# Patient Record
Sex: MALE | Race: WHITE | Employment: FULL TIME | URBAN - METROPOLITAN AREA
[De-identification: names, ages, dates, MRNs, and addresses within clinical notes are randomized per-mention and may not be internally consistent; named-entity substitution may affect disease eponyms.]

---

## 2017-02-27 ENCOUNTER — TELEPHONE (OUTPATIENT)
Dept: CARDIOLOGY CLINIC | Age: 62
End: 2017-02-27

## 2017-02-28 ENCOUNTER — TELEPHONE (OUTPATIENT)
Dept: CARDIOLOGY CLINIC | Age: 62
End: 2017-02-28

## 2017-03-09 ENCOUNTER — HOSPITAL ENCOUNTER (OUTPATIENT)
Dept: CT IMAGING | Age: 62
Discharge: HOME | End: 2017-03-09
Attending: FAMILY MEDICINE | Admitting: FAMILY MEDICINE

## 2017-03-09 DIAGNOSIS — D41.02 HEMANGIOPERICYTOMA OF KIDNEY, LEFT: ICD-10-CM

## 2017-03-09 RX ORDER — SODIUM CHLORIDE 0.9 % (FLUSH) 0.9 %
10 SYRINGE (ML) INJECTION
Status: COMPLETED | OUTPATIENT
Start: 2017-03-09 | End: 2017-03-09

## 2017-03-09 RX ADMIN — Medication 10 ML: at 11:19

## 2017-03-17 ENCOUNTER — NURSE ONLY (OUTPATIENT)
Dept: CARDIOLOGY CLINIC | Age: 62
End: 2017-03-17

## 2017-03-17 ENCOUNTER — OFFICE VISIT (OUTPATIENT)
Dept: CARDIOLOGY CLINIC | Age: 62
End: 2017-03-17

## 2017-03-17 VITALS
DIASTOLIC BLOOD PRESSURE: 70 MMHG | BODY MASS INDEX: 45.1 KG/M2 | HEART RATE: 71 BPM | OXYGEN SATURATION: 95 % | HEIGHT: 70 IN | SYSTOLIC BLOOD PRESSURE: 100 MMHG | WEIGHT: 315 LBS

## 2017-03-17 DIAGNOSIS — I48.0 PAF (PAROXYSMAL ATRIAL FIBRILLATION) (HCC): Primary | ICD-10-CM

## 2017-03-17 DIAGNOSIS — I48.91 ATRIAL FIBRILLATION WITH RVR (HCC): ICD-10-CM

## 2017-03-17 PROCEDURE — 93000 ELECTROCARDIOGRAM COMPLETE: CPT | Performed by: INTERNAL MEDICINE

## 2017-03-17 PROCEDURE — 99999 PR OFFICE/OUTPT VISIT,PROCEDURE ONLY: CPT | Performed by: INTERNAL MEDICINE

## 2017-03-17 PROCEDURE — 99214 OFFICE O/P EST MOD 30 MIN: CPT | Performed by: INTERNAL MEDICINE

## 2017-03-17 RX ORDER — ATORVASTATIN CALCIUM 40 MG/1
20 TABLET, FILM COATED ORAL DAILY
COMMUNITY
Start: 2017-02-23

## 2017-03-17 RX ORDER — AMLODIPINE BESYLATE 5 MG/1
2.5 TABLET ORAL DAILY
Qty: 30 TABLET | Refills: 3
Start: 2017-03-17 | End: 2017-06-07 | Stop reason: ALTCHOICE

## 2017-03-20 ENCOUNTER — TELEPHONE (OUTPATIENT)
Dept: CARDIOLOGY CLINIC | Age: 62
End: 2017-03-20

## 2017-03-24 ENCOUNTER — TELEPHONE (OUTPATIENT)
Dept: CARDIOLOGY CLINIC | Age: 62
End: 2017-03-24

## 2017-03-27 PROBLEM — G47.33 OSA (OBSTRUCTIVE SLEEP APNEA): Status: ACTIVE | Noted: 2017-03-27

## 2017-03-27 PROBLEM — G47.33 OSA ON CPAP: Status: ACTIVE | Noted: 2017-03-27

## 2017-03-27 PROBLEM — Z99.89 OSA ON CPAP: Status: ACTIVE | Noted: 2017-03-27

## 2017-04-02 ASSESSMENT — ENCOUNTER SYMPTOMS
CHEST TIGHTNESS: 0
EYE PAIN: 0
ABDOMINAL PAIN: 0
DIARRHEA: 0
WHEEZING: 0
NAUSEA: 0
BACK PAIN: 1
COUGH: 0
BLOOD IN STOOL: 0
VOMITING: 0
SHORTNESS OF BREATH: 1
PHOTOPHOBIA: 0
CONSTIPATION: 0
COLOR CHANGE: 0

## 2017-04-07 PROCEDURE — 93228 REMOTE 30 DAY ECG REV/REPORT: CPT | Performed by: INTERNAL MEDICINE

## 2017-04-19 ENCOUNTER — NURSE ONLY (OUTPATIENT)
Dept: CARDIOLOGY CLINIC | Age: 62
End: 2017-04-19

## 2017-04-19 ENCOUNTER — OFFICE VISIT (OUTPATIENT)
Dept: CARDIOLOGY CLINIC | Age: 62
End: 2017-04-19

## 2017-04-19 VITALS
HEART RATE: 62 BPM | DIASTOLIC BLOOD PRESSURE: 60 MMHG | WEIGHT: 315 LBS | HEIGHT: 71 IN | BODY MASS INDEX: 44.1 KG/M2 | SYSTOLIC BLOOD PRESSURE: 100 MMHG

## 2017-04-19 DIAGNOSIS — I48.0 PAF (PAROXYSMAL ATRIAL FIBRILLATION) (HCC): Primary | ICD-10-CM

## 2017-04-19 DIAGNOSIS — I49.3 PVC (PREMATURE VENTRICULAR CONTRACTION): ICD-10-CM

## 2017-04-19 DIAGNOSIS — I49.3 PVC (PREMATURE VENTRICULAR CONTRACTION): Primary | ICD-10-CM

## 2017-04-19 PROCEDURE — 99214 OFFICE O/P EST MOD 30 MIN: CPT | Performed by: INTERNAL MEDICINE

## 2017-04-19 PROCEDURE — 93000 ELECTROCARDIOGRAM COMPLETE: CPT | Performed by: INTERNAL MEDICINE

## 2017-04-19 PROCEDURE — 93225 XTRNL ECG REC<48 HRS REC: CPT | Performed by: INTERNAL MEDICINE

## 2017-04-26 PROCEDURE — 93227 XTRNL ECG REC<48 HR R&I: CPT | Performed by: INTERNAL MEDICINE

## 2017-04-28 ENCOUNTER — OFFICE VISIT (OUTPATIENT)
Dept: CARDIOLOGY CLINIC | Age: 62
End: 2017-04-28

## 2017-04-28 DIAGNOSIS — I48.0 PAF (PAROXYSMAL ATRIAL FIBRILLATION) (HCC): Primary | ICD-10-CM

## 2017-04-28 PROCEDURE — 99999 PR OFFICE/OUTPT VISIT,PROCEDURE ONLY: CPT | Performed by: INTERNAL MEDICINE

## 2017-04-30 ASSESSMENT — ENCOUNTER SYMPTOMS
WHEEZING: 0
SHORTNESS OF BREATH: 1
CHEST TIGHTNESS: 0
BLOOD IN STOOL: 0
ABDOMINAL PAIN: 0
COLOR CHANGE: 0
NAUSEA: 0
PHOTOPHOBIA: 0
BACK PAIN: 1
CONSTIPATION: 0
EYE PAIN: 0
VOMITING: 0
DIARRHEA: 0
COUGH: 0

## 2017-06-07 ENCOUNTER — OFFICE VISIT (OUTPATIENT)
Dept: BARIATRICS/WEIGHT MGMT | Age: 62
End: 2017-06-07

## 2017-06-07 VITALS
WEIGHT: 315 LBS | DIASTOLIC BLOOD PRESSURE: 53 MMHG | BODY MASS INDEX: 44.1 KG/M2 | HEART RATE: 69 BPM | SYSTOLIC BLOOD PRESSURE: 96 MMHG | HEIGHT: 71 IN

## 2017-06-07 DIAGNOSIS — E66.01 MORBID OBESITY WITH BMI OF 50.0-59.9, ADULT (HCC): Primary | ICD-10-CM

## 2017-06-07 PROCEDURE — 99205 OFFICE O/P NEW HI 60 MIN: CPT | Performed by: SURGERY

## 2017-06-09 DIAGNOSIS — E66.01 MORBID OBESITY WITH BMI OF 50.0-59.9, ADULT (HCC): Primary | ICD-10-CM

## 2017-06-13 ENCOUNTER — OFFICE VISIT (OUTPATIENT)
Dept: BARIATRICS/WEIGHT MGMT | Age: 62
End: 2017-06-13

## 2017-06-13 VITALS
HEART RATE: 62 BPM | BODY MASS INDEX: 44.1 KG/M2 | DIASTOLIC BLOOD PRESSURE: 58 MMHG | WEIGHT: 315 LBS | SYSTOLIC BLOOD PRESSURE: 114 MMHG | HEIGHT: 71 IN

## 2017-06-13 DIAGNOSIS — E66.01 OBESITY, MORBID, BMI 50 OR HIGHER (HCC): Primary | ICD-10-CM

## 2017-06-13 PROCEDURE — 99999 PR OFFICE/OUTPT VISIT,PROCEDURE ONLY: CPT

## 2017-09-13 ENCOUNTER — OFFICE VISIT (OUTPATIENT)
Dept: BARIATRICS/WEIGHT MGMT | Age: 62
End: 2017-09-13

## 2017-09-13 VITALS
BODY MASS INDEX: 44.1 KG/M2 | WEIGHT: 315 LBS | SYSTOLIC BLOOD PRESSURE: 126 MMHG | HEART RATE: 65 BPM | HEIGHT: 71 IN | DIASTOLIC BLOOD PRESSURE: 73 MMHG

## 2017-09-13 DIAGNOSIS — E66.01 MORBID OBESITY WITH BMI OF 50.0-59.9, ADULT (HCC): Primary | ICD-10-CM

## 2017-09-13 PROCEDURE — 99214 OFFICE O/P EST MOD 30 MIN: CPT | Performed by: SURGERY

## 2017-09-13 RX ORDER — BUSPIRONE HYDROCHLORIDE 10 MG/1
15 TABLET ORAL
COMMUNITY

## 2017-09-25 ENCOUNTER — TELEPHONE (OUTPATIENT)
Dept: BARIATRICS/WEIGHT MGMT | Age: 62
End: 2017-09-25

## 2017-10-12 ENCOUNTER — OFFICE VISIT (OUTPATIENT)
Dept: BARIATRICS/WEIGHT MGMT | Age: 62
End: 2017-10-12

## 2017-10-12 VITALS
SYSTOLIC BLOOD PRESSURE: 120 MMHG | BODY MASS INDEX: 44.1 KG/M2 | HEART RATE: 62 BPM | OXYGEN SATURATION: 95 % | DIASTOLIC BLOOD PRESSURE: 76 MMHG | WEIGHT: 315 LBS | HEIGHT: 71 IN

## 2017-10-12 DIAGNOSIS — E66.01 MORBID OBESITY WITH BMI OF 50.0-59.9, ADULT (HCC): Primary | ICD-10-CM

## 2017-10-12 DIAGNOSIS — Z01.818 PREOPERATIVE EVALUATION TO RULE OUT SURGICAL CONTRAINDICATION: ICD-10-CM

## 2017-10-12 DIAGNOSIS — G47.33 OBSTRUCTIVE SLEEP APNEA: ICD-10-CM

## 2017-10-12 PROCEDURE — 99214 OFFICE O/P EST MOD 30 MIN: CPT | Performed by: NURSE PRACTITIONER

## 2017-10-12 ASSESSMENT — ENCOUNTER SYMPTOMS
TROUBLE SWALLOWING: 0
SORE THROAT: 0
NAUSEA: 0
EYE DISCHARGE: 0
ABDOMINAL DISTENTION: 0
ANAL BLEEDING: 0
BACK PAIN: 1
CONSTIPATION: 0
BLOOD IN STOOL: 0
CHEST TIGHTNESS: 0
ALLERGIC/IMMUNOLOGIC NEGATIVE: 1
COUGH: 0
EYES NEGATIVE: 1
DIARRHEA: 0
EYE PAIN: 0
COLOR CHANGE: 0
CHOKING: 0
VOMITING: 0
SHORTNESS OF BREATH: 1
WHEEZING: 0
EYE ITCHING: 0
GASTROINTESTINAL NEGATIVE: 1

## 2017-10-12 NOTE — PROGRESS NOTES
1 tablet by mouth daily 60 tablet 3    metFORMIN (GLUCOPHAGE) 1000 MG tablet Take 1,000 mg by mouth 2 times daily (with meals)      hydrOXYzine (ATARAX) 50 MG tablet Take 100 mg by mouth nightly       aspirin EC 81 MG EC tablet Take 1 tablet by mouth daily. 30 tablet 3    gabapentin (NEURONTIN) 600 MG tablet Take 600 mg by mouth 2 times daily       FLUoxetine (PROZAC) 10 MG capsule Take 20 mg by mouth daily       lisinopril (PRINIVIL;ZESTRIL) 40 MG tablet Take 40 mg by mouth 2 times daily       buPROPion (WELLBUTRIN SR) 150 MG SR tablet Take 150 mg by mouth daily       doxazosin (CARDURA) 4 MG tablet Take 4 mg by mouth nightly. No current facility-administered medications for this visit. No Known Allergies        Review of Systems   Constitutional: Negative. Negative for appetite change, chills, fatigue and fever. HENT: Negative. Negative for congestion, hearing loss, sore throat, tinnitus and trouble swallowing. Eyes: Negative. Negative for pain, discharge and itching. Respiratory: Positive for shortness of breath (with exertion). Negative for cough, choking, chest tightness and wheezing. Sleep apnea   Cardiovascular: Positive for leg swelling (mild). Negative for chest pain and palpitations. Gastrointestinal: Negative. Negative for abdominal distention, anal bleeding, blood in stool, constipation, diarrhea, nausea and vomiting. Endocrine: Negative. Negative for cold intolerance and heat intolerance. Diabetes Type II   Genitourinary: Negative. Negative for dysuria, frequency and urgency. Musculoskeletal: Positive for back pain. Negative for arthralgias, gait problem and myalgias. Skin: Negative. Negative for color change, pallor and rash. Allergic/Immunologic: Negative. Negative for environmental allergies and food allergies. Neurological: Negative. Negative for dizziness, seizures, weakness and numbness. Hematological: Bruises/bleeds easily. Eliquis   Psychiatric/Behavioral: Positive for dysphoric mood and sleep disturbance. Negative for agitation, behavioral problems and suicidal ideas. The patient is nervous/anxious. Depression/anxiety controlled with medication       OBJECTIVE:    /76 (Site: Left Arm, Position: Sitting, Cuff Size: Large Adult)   Pulse 62   Ht 5' 11\" (1.803 m)   Wt (!) 368 lb 11.2 oz (167.2 kg)   SpO2 95%   BMI 51.42 kg/m²      Physical Exam   Constitutional: He is oriented to person, place, and time. He appears well-developed and well-nourished. HENT:   Head: Normocephalic and atraumatic. Eyes: Conjunctivae and EOM are normal. Pupils are equal, round, and reactive to light. Neck: Normal range of motion. Neck supple. No JVD present. No tracheal deviation present. No thyromegaly present. Cardiovascular: Normal rate, regular rhythm, normal heart sounds and intact distal pulses. Exam reveals no gallop and no friction rub. No murmur heard. Pulmonary/Chest: Breath sounds normal. No respiratory distress. He has no wheezes. He has no rales. He exhibits no tenderness. Abdominal: Soft. Bowel sounds are normal. He exhibits distension. He exhibits no mass. There is no tenderness. There is no rebound and no guarding. Morbid obese; softly distended   Musculoskeletal: Normal range of motion. Lymphadenopathy:     He has no cervical adenopathy. Neurological: He is alert and oriented to person, place, and time. Skin: Skin is warm and dry. Psychiatric: He has a normal mood and affect. Vitals reviewed. ASSESSMENT & PLAN:    1. Morbid obesity with BMI of 50.0-59.9, adult ([address]  -Encouraged consistency with weight loss; avoid over eating and high calorie snacks; avoid skipping meals  -Discussed below recommendations  -Needs physical therapy and Cardiology and Pulmonology clearance  -EGD tomorrow    2.  Preoperative evaluation to rule out surgical contraindication  -Needs to be consistent with weight loss and appts. Discussed importance with patient  -Complete PT and Pulmonology clearance  -Placed Cardiology referral for clearance    3. Obstructive sleep apnea  -Continue using Albuterol as directed  -Continue to use CPAP and follow-up with Dr. Fran Gregorio as scheduled       Patient was encouraged to journal all food intake. Keep calorie level at approximately 2145-4126. Protein intake is to be a minimum of 40-50 grams per day. Water drinking was encouraged with a goal of 64oz-128oz daily. Beverages to be calorie free except for milk and avoid soda. Continue to increase level of physical activity. I spent 25 minutes with the patient face to face today and over 50% of the office visit today was spent in face to face counseling regarding diet and exercise, in preparation for his planned Robotic Sleeve Gastrectomy. Discussed in length complying with the dietary recommendations, complying with the preoperative workup including dietary counseling, exercise physiologist counseling, and pre-operative optimization of pulmonologist and cardiologist.  The patient expressed understanding and willingness to comply nicely; all questions and concerns addressed. No orders of the defined types were placed in this encounter. No orders of the defined types were placed in this encounter. Follow Up:  No Follow-up on file.     Electronically signed by Camilo Field CNP on 10/12/2017 at 12:25 PM

## 2017-10-13 DIAGNOSIS — E66.01 MORBID OBESITY WITH BMI OF 50.0-59.9, ADULT (HCC): Primary | ICD-10-CM

## 2017-10-20 ENCOUNTER — HOSPITAL ENCOUNTER (OUTPATIENT)
Dept: OTHER | Age: 62
Discharge: HOME | End: 2017-10-31
Attending: SURGERY | Admitting: SURGERY

## 2017-10-25 ENCOUNTER — OFFICE VISIT (OUTPATIENT)
Dept: BARIATRICS/WEIGHT MGMT | Age: 62
End: 2017-10-25

## 2017-10-25 VITALS
WEIGHT: 315 LBS | BODY MASS INDEX: 45.1 KG/M2 | DIASTOLIC BLOOD PRESSURE: 80 MMHG | HEART RATE: 78 BPM | SYSTOLIC BLOOD PRESSURE: 138 MMHG | HEIGHT: 70 IN

## 2017-10-25 DIAGNOSIS — E66.01 MORBID OBESITY WITH BMI OF 50.0-59.9, ADULT (HCC): Primary | ICD-10-CM

## 2017-10-25 PROCEDURE — 99214 OFFICE O/P EST MOD 30 MIN: CPT | Performed by: SURGERY

## 2017-10-25 PROCEDURE — 1036F TOBACCO NON-USER: CPT | Performed by: SURGERY

## 2017-10-25 PROCEDURE — 3017F COLORECTAL CA SCREEN DOC REV: CPT | Performed by: SURGERY

## 2017-10-25 PROCEDURE — G8484 FLU IMMUNIZE NO ADMIN: HCPCS | Performed by: SURGERY

## 2017-10-25 PROCEDURE — G8427 DOCREV CUR MEDS BY ELIG CLIN: HCPCS | Performed by: SURGERY

## 2017-10-25 PROCEDURE — G8417 CALC BMI ABV UP PARAM F/U: HCPCS | Performed by: SURGERY

## 2017-10-25 ASSESSMENT — ENCOUNTER SYMPTOMS
VOICE CHANGE: 0
BLOOD IN STOOL: 0
NAUSEA: 0
VOMITING: 0
ABDOMINAL PAIN: 0
COUGH: 0
PHOTOPHOBIA: 0
TROUBLE SWALLOWING: 0
WHEEZING: 0
SORE THROAT: 0
ANAL BLEEDING: 0
CONSTIPATION: 0
SHORTNESS OF BREATH: 0
DIARRHEA: 0
COLOR CHANGE: 0

## 2017-10-25 NOTE — PROGRESS NOTES
BARIATRIC SURGERY OFFICE NOTE    SUBJECTIVE:    Patient presenting today referred from Eva Brennan MD, for   Chief Complaint   Patient presents with    Weight Loss     visit #4    . Vitals:    10/25/17 1108   BP: 138/80   Pulse: 78        HPI: Willi Dutta is a 58 y.o. male presenting in fourth bariatric visit, follow up diet and exercise - pre-operative weight loss, in consideration for bariatric surgery. BMI: Body mass index is 52.62 kg/m². Obesity Classification: Super Morbid Obesity. Weight History: Wt Readings from Last 3 Encounters:   10/25/17 (!) 366 lb 11.2 oz (166.3 kg)   10/11/17 (!) 368 lb (166.9 kg)   10/12/17 (!) 368 lb 11.2 oz (167.2 kg)     Total weight loss 2 Lbs  Since last visit, total 1.7 lbs since 1st visit    Patient dines out to a sit down restaurant  times per 0. Patient eats fast food meals 2 times per mo    Drinks mostly water and some crystal light, a few pepsi, sugar free    24 hour recall/food frequency chart:  Breakfast: banana   Snack:   Lunch:  cottage cheese apples,   Snack:  Dinner: meatloaf mashed potatoes  Snack: banana    Total daily calories: no      Exercises 1  Times per day, resistance band, hand bike, walking         Thoroughly reviewed the patient's medical history, family history, social history and review of systems with the patient today in the office. Please see medical record for pertinent positives.       Past Medical History:   Diagnosis Date    Anxiety     Arthritis     Bipolar disorder ([facility] [address]     BPH (benign prostatic hyperplasia)     Chronic back pain     Chronic low back pain     \"had fractured back - 2004\"    CKD (chronic kidney disease)     \"took him off Metformin and decrease his Lasix to try and protect his kidneys\" \"they just said the kidneys are lower functioning-has not seen kidney doctor\" per wife    COPD (chronic obstructive pulmonary disease) ([facility] [address]     follows with Dr Bunn Sport Depression     Diabetic neuropathy ([facility] [address]  Gastric reflux     Hepatitis     :had this 40 yrs ago not sure what type\"    History of cardiac monitoring 03/17/2017    21 day event: Sinus rhythm No atrial fibrillation Patient has PVCs and this correlates with symptoms of flutter and palpitations.  Plan - Holter for 24 hrs to assess PVC burden (holter applied 4/19)    History of Holter monitoring 04/21/2017    48 hour monitoring: No significant arrhythmias    History of kidney stones     Hyperlipidemia     Hypertension     Irregular heart beat     follows with Dr Diamond Morris Obesity     Sleep apnea     had sleep study several yrs ago- does use cpap    Type 2 diabetes mellitus without complication ([facility] [address]     dx 2015      Past Surgical History:   Procedure Laterality Date    CARDIAC CATHETERIZATION      per old chart had cardiac cath done 9/2012    COLONOSCOPY      per old chart hx colonoscopy with Dr Jordin Pop 9/2010    JOINT REPLACEMENT      per wife total right knee done 2014  and left knee 2015    KIDNEY STONE SURGERY  10+ yrs ago    KNEE SURGERY      right, left/ ( had scope both knee prior to total)- while in service had surgery on right knee also    SHOULDER SURGERY  1982    right    VASECTOMY       Current Outpatient Prescriptions   Medication Sig Dispense Refill    glimepiride (AMARYL) 1 MG tablet Take 1 mg by mouth nightly      amLODIPine (NORVASC) 5 MG tablet Take 5 mg by mouth daily      busPIRone (BUSPAR) 10 MG tablet Take 15 mg by mouth Takes one tab in am and two tabs at hs      fluticasone (FLONASE) 50 MCG/ACT nasal spray 1 spray by Nasal route daily 3 Bottle 3    azelastine (ASTELIN) 0.1 % nasal spray 2 sprays by Nasal route 2 times daily Use in each nostril as directed 3 Bottle 3    Spacer/Aero-Holding Chambers (AEROCHAMBER) MISC Inhale 1 Device into the lungs every 6 hours 1 each 0    atorvastatin (LIPITOR) 40 MG tablet Take 20 mg by mouth daily       amiodarone (CORDARONE) 200 MG tablet Take 1 tablet by mouth daily 30 tablet Judgment and thought content normal.   Vitals reviewed. POST-OP DIAGNOSIS: Same +   Per the EGD performed all the way to the 3rd portion of the duodenum:  - Z-line was @ 42 cm from the superior incisors' level  - Some GERD stigmata noted, Small size sliding Hiatal Hernia noted, with mild GERD but no changes suspicious of Solares's  - Mild antral gastritis  - No peptic ulcer disease  - No biliary reflux    ASSESSMENT & PLAN:    1. Morbid obesity with BMI of 50.0-59.9, adult ([address]         Nicely complicant - 4/6 months - workup complete - doing very well - continue D/E. Patient was encouraged to journal all food intake. Keep calorie level at approximately 3982-7216. Protein intake is to be a minimum of 60-80 grams per day. Water drinking was encouraged with a goal of 64oz-128oz daily. Beverages to be calorie free except for milk. Every other beverage should be water. They are to avoid soda. Continue to increase level of physical activity. More than 50% of the office visit today was spent in face to face counseling regarding diet and exercise, in preparation for his planned Robotic Sleeve Gastrectomy. Counting calories, complying with the dietitian's recommendations, and complying with the preoperative workup including the dietitian counseling, exercise physiologist counseling, cardiologist evaluation and pre-operative optimization, pulmonologist evaluation and pre operative optimization, pre operative EGD evaluation. The patient expressed understanding and willingness to comply nicely; all questions and concerns addressed in details. Patient counseled on the risks, benefits, and alternatives of treatment plan at length while in the office today. Patient states an understanding and willingness to proceed with the plan. Follow Up:  Return in about 4 weeks (around 11/22/2017) for For imaging and tests results review, Bariatric follow up: diet, exercise & weight loss.       Pal Ramey MD,

## 2017-10-31 ENCOUNTER — TELEPHONE (OUTPATIENT)
Dept: BARIATRICS/WEIGHT MGMT | Age: 62
End: 2017-10-31

## 2017-10-31 DIAGNOSIS — Z01.818 PREOPERATIVE EVALUATION TO RULE OUT SURGICAL CONTRAINDICATION: Primary | ICD-10-CM

## 2017-10-31 NOTE — TELEPHONE ENCOUNTER
Please notify patient that referral was placed for Cardiologist for clearance for bariatric surgery. Thank you.

## 2017-11-01 ENCOUNTER — HOSPITAL ENCOUNTER (OUTPATIENT)
Dept: OTHER | Age: 62
Discharge: HOME | End: 2017-11-08
Attending: SURGERY | Admitting: SURGERY

## 2017-12-06 ENCOUNTER — OFFICE VISIT (OUTPATIENT)
Dept: BARIATRICS/WEIGHT MGMT | Age: 62
End: 2017-12-06

## 2017-12-06 VITALS
SYSTOLIC BLOOD PRESSURE: 115 MMHG | BODY MASS INDEX: 44.1 KG/M2 | RESPIRATION RATE: 16 BRPM | WEIGHT: 315 LBS | DIASTOLIC BLOOD PRESSURE: 58 MMHG | HEIGHT: 71 IN | HEART RATE: 67 BPM

## 2017-12-06 DIAGNOSIS — G47.33 OSA (OBSTRUCTIVE SLEEP APNEA): ICD-10-CM

## 2017-12-06 DIAGNOSIS — K44.9 HIATAL HERNIA: ICD-10-CM

## 2017-12-06 DIAGNOSIS — E66.01 MORBID OBESITY WITH BMI OF 50.0-59.9, ADULT (HCC): ICD-10-CM

## 2017-12-06 DIAGNOSIS — G47.33 OSA ON CPAP: ICD-10-CM

## 2017-12-06 DIAGNOSIS — I48.91 ATRIAL FIBRILLATION WITH RVR (HCC): Primary | ICD-10-CM

## 2017-12-06 DIAGNOSIS — Z99.89 OSA ON CPAP: ICD-10-CM

## 2017-12-06 PROCEDURE — 3017F COLORECTAL CA SCREEN DOC REV: CPT | Performed by: SURGERY

## 2017-12-06 PROCEDURE — G8417 CALC BMI ABV UP PARAM F/U: HCPCS | Performed by: SURGERY

## 2017-12-06 PROCEDURE — G8484 FLU IMMUNIZE NO ADMIN: HCPCS | Performed by: SURGERY

## 2017-12-06 PROCEDURE — 1036F TOBACCO NON-USER: CPT | Performed by: SURGERY

## 2017-12-06 PROCEDURE — G8427 DOCREV CUR MEDS BY ELIG CLIN: HCPCS | Performed by: SURGERY

## 2017-12-06 PROCEDURE — 99214 OFFICE O/P EST MOD 30 MIN: CPT | Performed by: SURGERY

## 2017-12-06 ASSESSMENT — ENCOUNTER SYMPTOMS
COUGH: 0
PHOTOPHOBIA: 0
ANAL BLEEDING: 0
COLOR CHANGE: 0
BACK PAIN: 1
NAUSEA: 0
ABDOMINAL PAIN: 0
VOMITING: 0
SHORTNESS OF BREATH: 0
DIARRHEA: 0
SORE THROAT: 0
WHEEZING: 0
BLOOD IN STOOL: 0
CONSTIPATION: 0
TROUBLE SWALLOWING: 0
VOICE CHANGE: 0

## 2017-12-06 NOTE — PROGRESS NOTES
BARIATRIC SURGERY OFFICE NOTE    SUBJECTIVE:    Patient presenting today referred from Catie Barajas MD, for   Chief Complaint   Patient presents with   [employer]     5th WM visit, diet, exercise, and pre-surgical weight loss. .    Vitals:    12/06/17 0928   BP: (!) 115/58   Pulse: 67   Resp: 16        HPI: Vish Sparrow is a 58 y.o. male  presenting in fifth bariatric visit, follow up diet and exercise - pre-operative weight loss, in consideration for bariatric surgery. BMI: Body mass index is 51.55 kg/m². Obesity Classification: III Morbid Obesity. Weight History: Wt Readings from Last 3 Encounters:   12/06/17 (!) 369 lb 9.6 oz (167.6 kg)   10/25/17 (!) 366 lb 11.2 oz (166.3 kg)   10/11/17 (!) 368 lb (166.9 kg)     Total weight loss/gain +2.9 Lbs over 6 week. Patient dines out to a sit down restaurant 2 times per month. Patient eats fast food meals 2 times per month. Drinks mostly water and diet pepsi    24 hour recall/food frequency chart:  Breakfast: banana  Snack: slimfast shake  Lunch: sandwich  Snack: none  Dinner: 2 slices of pizza  Snack: none    Total daily calories: not calculating      Exercises: weights and walking 3 times per week          Thoroughly reviewed the patient's medical history, family history, social history and review of systems with the patient today in the office. Please see medical record for pertinent positives.       Past Medical History:   Diagnosis Date    Anxiety     Arthritis     B[facility] disorder ([facility] [address]     BPH (benign prostatic hyperplasia)     Chronic back pain     Chronic low back pain     \"had fractured back - 2004\"    CKD (chronic kidney disease)     \"took him off Metformin and decrease his Lasix to try and protect his kidneys\" \"they just said the kidneys are lower functioning-has not seen kidney doctor\" per wife    COPD (chronic obstructive pulmonary disease) ([facility] [address]     follows with Dr Omi Mejia Depression     Diabetic neuropathy ([facility] [address]     Gastric reflux     Hepatitis     :had this 40 yrs ago not sure what type\"    History of cardiac monitoring 03/17/2017    21 day event: Sinus rhythm No atrial fibrillation Patient has PVCs and this correlates with symptoms of flutter and palpitations.  Plan - Holter for 24 hrs to assess PVC burden (holter applied 4/19)    History of Holter monitoring 04/21/2017    48 hour monitoring: No significant arrhythmias    History of kidney stones     Hyperlipidemia     Hypertension     Irregular heart beat     follows with Dr Bandar Portillo Obesity     Sleep apnea     had sleep study several yrs ago- does use cpap    Type 2 diabetes mellitus without complication ([facility] [address]     dx 2015      Past Surgical History:   Procedure Laterality Date    CARDIAC CATHETERIZATION      per old chart had cardiac cath done 9/2012    COLONOSCOPY      per old chart hx colonoscopy with Dr Bernie Fernandez 9/2010    JOINT REPLACEMENT      per wife total right knee done 2014  and left knee 2015    KIDNEY STONE SURGERY  10+ yrs ago    KNEE SURGERY      right, left/ ( had scope both knee prior to total)- while in service had surgery on right knee also    SHOULDER SURGERY  1982    right    VASECTOMY       Current Outpatient Prescriptions   Medication Sig Dispense Refill    glimepiride (AMARYL) 1 MG tablet Take 1 mg by mouth nightly      amLODIPine (NORVASC) 5 MG tablet Take 5 mg by mouth daily      busPIRone (BUSPAR) 10 MG tablet Take 15 mg by mouth Takes one tab in am and two tabs at hs      fluticasone (FLONASE) 50 MCG/ACT nasal spray 1 spray by Nasal route daily 3 Bottle 3    azelastine (ASTELIN) 0.1 % nasal spray 2 sprays by Nasal route 2 times daily Use in each nostril as directed 3 Bottle 3    Spacer/Aero-Holding Chambers (AEROCHAMBER) MISC Inhale 1 Device into the lungs every 6 hours 1 each 0    atorvastatin (LIPITOR) 40 MG tablet Take 20 mg by mouth daily       amiodarone (CORDARONE) 200 MG tablet Take 1 tablet by mouth daily 30 tablet 0    apixaban (ELIQUIS) 5 MG TABS tablet Take 1 tablet by mouth 2 times daily 60 tablet 0    lamoTRIgine (LAMICTAL) 100 MG tablet Take 1 tablet by mouth daily (Patient taking differently: Take 150 mg by mouth 2 times daily ) 30 tablet 0    risperiDONE (RISPERDAL) 1 MG tablet Take 1 tablet by mouth daily (Patient taking differently: Take 1 mg by mouth 2 times daily Takes one tab in am and two tabs at hs) 60 tablet 0    metoprolol succinate (TOPROL XL) 50 MG extended release tablet Take 1 tablet by mouth daily 30 tablet 0    metolazone (ZAROXOLYN) 2.5 MG tablet Take 1 tablet by mouth daily 30 tablet 3    furosemide (LASIX) 20 MG tablet Take 2 tablets by mouth 2 times daily (Patient taking differently: Take 20 mg by mouth 2 times daily Takes two tabs in am( 40 mg and one tab at hs ( 20 mg)) 60 tablet 3    potassium chloride (KLOR-CON M) 20 MEQ extended release tablet Take 1 tablet by mouth daily 60 tablet 3    metFORMIN (GLUCOPHAGE) 1000 MG tablet Take 1,000 mg by mouth 2 times daily (with meals)      hydrOXYzine (ATARAX) 50 MG tablet Take 100 mg by mouth nightly       aspirin EC 81 MG EC tablet Take 1 tablet by mouth daily. 30 tablet 3    gabapentin (NEURONTIN) 600 MG tablet Take 600 mg by mouth 2 times daily       FLUoxetine (PROZAC) 10 MG capsule Take 20 mg by mouth daily       lisinopril (PRINIVIL;ZESTRIL) 40 MG tablet Take 40 mg by mouth 2 times daily       buPROPion (WELLBUTRIN SR) 150 MG SR tablet Take 150 mg by mouth daily       doxazosin (CARDURA) 4 MG tablet Take 4 mg by mouth nightly. No current facility-administered medications for this visit. No Known Allergies        Review of Systems   Constitutional: Positive for appetite change, fatigue and unexpected weight change. Negative for activity change, chills, diaphoresis and fever. HENT: Negative for sore throat, trouble swallowing and voice change. Eyes: Negative for photophobia and visual disturbance. He has no cervical adenopathy. Neurological: He is alert and oriented to person, place, and time. No cranial nerve deficit. Coordination normal.   Skin: Skin is warm and dry. No rash noted. He is not diaphoretic. No erythema. No pallor. Psychiatric: His behavior is normal. Judgment and thought content normal.   Vitals reviewed. ASSESSMENT & PLAN:    1. Atrial fibrillation with RVR ([facility] [address]    2. Hiatal hernia    3. Morbid obesity with BMI of 50.0-59.9, adult ([facility] [address]    4. KALIA (obstructive sleep apnea)    5. KALIA on CPAP         October 13, 2017     PRE-OP DIAGNOSIS: GERD / Morbid obesity - Body mass index is 52.8 kg/m².     POST-OP DIAGNOSIS: Same +   Per the EGD performed all the way to the 3rd portion of the duodenum:  - Z-line was @ 42 cm from the superior incisors' level  - Some GERD stigmata noted, Small size sliding Hiatal Hernia noted, with mild GERD but no changes suspicious of Solares's  - Mild antral gastritis  - No peptic ulcer disease  - No biliary reflux       Patient was encouraged to journal all food intake. Keep calorie level at approximately 6847-7910. Protein intake is to be a minimum of 60-80 grams per day. Water drinking was encouraged with a goal of 64oz-128oz daily. Beverages to be calorie free except for milk. Every other beverage should be water. They are to avoid soda. Continue to increase level of physical activity. More than 50% of the office visit today (25 min) was spent in face to face counseling regarding diet and exercise, in preparation for his planned Robotic Sleeve Gastrectomy. Counting calories, complying with the dietitian's recommendations, and complying with the preoperative workup including the dietitian counseling, exercise physiologist counseling, cardiologist evaluation and pre-operative optimization, pulmonologist evaluation and pre operative optimization, pre operative EGD evaluation.   The patient expressed understanding and willingness to comply nicely; all questions and concerns addressed in details. Patient counseled on the risks, benefits, and alternatives of treatment plan at length while in the office today. Patient states an understanding and willingness to proceed with the plan. Follow Up:  Return in about 4 weeks (around 1/3/2018) for For imaging and tests results review, Bariatric follow up: diet, exercise & weight loss.       Snehal Moss MD, FACS, FICS  Member of the [employer]    [phone]    12/6/17

## 2017-12-19 ENCOUNTER — HOSPITAL ENCOUNTER (OUTPATIENT)
Dept: CT IMAGING | Age: 62
Discharge: HOME | End: 2017-12-19
Attending: FAMILY MEDICINE | Admitting: FAMILY MEDICINE

## 2017-12-19 DIAGNOSIS — R59.9 SWELLING OF LYMPH NODES: ICD-10-CM

## 2018-01-04 ENCOUNTER — HOSPITAL ENCOUNTER (OUTPATIENT)
Dept: ULTRASOUND IMAGING | Age: 63
Discharge: HOME | End: 2018-01-04
Attending: FAMILY MEDICINE | Admitting: FAMILY MEDICINE

## 2018-01-04 DIAGNOSIS — N28.1 ACQUIRED CYST OF KIDNEY: ICD-10-CM

## 2018-01-29 ENCOUNTER — OFFICE VISIT (OUTPATIENT)
Dept: BARIATRICS/WEIGHT MGMT | Age: 63
End: 2018-01-29

## 2018-01-29 VITALS
HEART RATE: 65 BPM | DIASTOLIC BLOOD PRESSURE: 59 MMHG | SYSTOLIC BLOOD PRESSURE: 108 MMHG | RESPIRATION RATE: 20 BRPM | WEIGHT: 315 LBS | HEIGHT: 71 IN | BODY MASS INDEX: 44.1 KG/M2

## 2018-01-29 DIAGNOSIS — G47.33 OSA ON CPAP: ICD-10-CM

## 2018-01-29 DIAGNOSIS — E11.69 DIABETES MELLITUS TYPE 2 IN OBESE (HCC): ICD-10-CM

## 2018-01-29 DIAGNOSIS — Z99.89 OSA ON CPAP: ICD-10-CM

## 2018-01-29 DIAGNOSIS — E66.9 DIABETES MELLITUS TYPE 2 IN OBESE (HCC): ICD-10-CM

## 2018-01-29 DIAGNOSIS — E66.01 MORBID OBESITY WITH BMI OF 50.0-59.9, ADULT (HCC): Primary | ICD-10-CM

## 2018-01-29 PROCEDURE — 1036F TOBACCO NON-USER: CPT | Performed by: NURSE PRACTITIONER

## 2018-01-29 PROCEDURE — G8484 FLU IMMUNIZE NO ADMIN: HCPCS | Performed by: NURSE PRACTITIONER

## 2018-01-29 PROCEDURE — 99214 OFFICE O/P EST MOD 30 MIN: CPT | Performed by: NURSE PRACTITIONER

## 2018-01-29 PROCEDURE — G8417 CALC BMI ABV UP PARAM F/U: HCPCS | Performed by: NURSE PRACTITIONER

## 2018-01-29 PROCEDURE — G8427 DOCREV CUR MEDS BY ELIG CLIN: HCPCS | Performed by: NURSE PRACTITIONER

## 2018-01-29 PROCEDURE — 3017F COLORECTAL CA SCREEN DOC REV: CPT | Performed by: NURSE PRACTITIONER

## 2018-01-29 PROCEDURE — 3046F HEMOGLOBIN A1C LEVEL >9.0%: CPT | Performed by: NURSE PRACTITIONER

## 2018-01-29 ASSESSMENT — ENCOUNTER SYMPTOMS
EYE PAIN: 0
NAUSEA: 0
ABDOMINAL PAIN: 0
ABDOMINAL DISTENTION: 0
DIARRHEA: 0
BACK PAIN: 1
CHEST TIGHTNESS: 0
SHORTNESS OF BREATH: 0
WHEEZING: 0
RHINORRHEA: 0
TROUBLE SWALLOWING: 0

## 2018-03-01 ENCOUNTER — OFFICE VISIT (OUTPATIENT)
Dept: BARIATRICS/WEIGHT MGMT | Age: 63
End: 2018-03-01

## 2018-03-01 VITALS
DIASTOLIC BLOOD PRESSURE: 62 MMHG | WEIGHT: 315 LBS | SYSTOLIC BLOOD PRESSURE: 104 MMHG | HEIGHT: 71 IN | HEART RATE: 68 BPM | RESPIRATION RATE: 20 BRPM | BODY MASS INDEX: 44.1 KG/M2

## 2018-03-01 DIAGNOSIS — G47.33 OSA (OBSTRUCTIVE SLEEP APNEA): ICD-10-CM

## 2018-03-01 DIAGNOSIS — E66.01 MORBID OBESITY WITH BMI OF 50.0-59.9, ADULT (HCC): Primary | ICD-10-CM

## 2018-03-01 PROCEDURE — 1036F TOBACCO NON-USER: CPT | Performed by: NURSE PRACTITIONER

## 2018-03-01 PROCEDURE — G8417 CALC BMI ABV UP PARAM F/U: HCPCS | Performed by: NURSE PRACTITIONER

## 2018-03-01 PROCEDURE — G8427 DOCREV CUR MEDS BY ELIG CLIN: HCPCS | Performed by: NURSE PRACTITIONER

## 2018-03-01 PROCEDURE — G8484 FLU IMMUNIZE NO ADMIN: HCPCS | Performed by: NURSE PRACTITIONER

## 2018-03-01 PROCEDURE — 3017F COLORECTAL CA SCREEN DOC REV: CPT | Performed by: NURSE PRACTITIONER

## 2018-03-01 PROCEDURE — 99213 OFFICE O/P EST LOW 20 MIN: CPT | Performed by: NURSE PRACTITIONER

## 2018-03-01 ASSESSMENT — ENCOUNTER SYMPTOMS
CHEST TIGHTNESS: 0
EYE PAIN: 0
RHINORRHEA: 0
WHEEZING: 0
ABDOMINAL DISTENTION: 0
DIARRHEA: 0
SHORTNESS OF BREATH: 0
BACK PAIN: 1
NAUSEA: 0
TROUBLE SWALLOWING: 0
ABDOMINAL PAIN: 0

## 2018-03-01 NOTE — PROGRESS NOTES
obstructive pulmonary disease) ([facility]     follows with Dr Charanjit Klein Depression     Diabetic neuropathy ([facility]     Gastric reflux     Hepatitis     :had this 40 yrs ago not sure what type\"    History of cardiac monitoring 03/17/2017    21 day event: Sinus rhythm No atrial fibrillation Patient has PVCs and this correlates with symptoms of flutter and palpitations.  Plan - Holter for 24 hrs to assess PVC burden (holter applied 4/19)    History of Holter monitoring 04/21/2017    48 hour monitoring: No significant arrhythmias    History of kidney stones     Hyperlipidemia     Hypertension     Irregular heart beat     follows with Dr Sunita Kim Obesity     Sleep apnea     had sleep study several yrs ago- does use cpap    Type 2 diabetes mellitus without complication ([facility]     dx 2015      Patient Active Problem List   Diagnosis    Atrial fibrillation with RVR ([facility]    KALIA (obstructive sleep apnea)    KALIA on CPAP    Morbid obesity with BMI of 50.0-59.9, adult ([facility]    Hiatal hernia     Past Surgical History:   Procedure Laterality Date    CARDIAC CATHETERIZATION      per old chart had cardiac cath done 9/2012    COLONOSCOPY      per old chart hx colonoscopy with Dr Yolie Bishop 9/2010    JOINT REPLACEMENT      per wife total right knee done 2014  and left knee 2015    KIDNEY STONE SURGERY  10+ yrs ago    KNEE SURGERY      right, left/ ( had scope both knee prior to total)- while in service had surgery on right knee also    SHOULDER SURGERY  1982    right    VASECTOMY       Current Outpatient Prescriptions   Medication Sig Dispense Refill    glimepiride (AMARYL) 1 MG tablet Take 1 mg by mouth nightly      amLODIPine (NORVASC) 5 MG tablet Take 5 mg by mouth daily      busPIRone (BUSPAR) 10 MG tablet Take 15 mg by mouth Takes one tab in am and two tabs at hs      fluticasone (FLONASE) 50 MCG/ACT nasal spray 1 spray by Nasal route daily 3 Bottle 3    azelastine (ASTELIN) 0.1 % nasal spray 2 sprays by

## 2018-07-24 ENCOUNTER — HOSPITAL ENCOUNTER (OUTPATIENT)
Dept: MRI IMAGING | Age: 63
Discharge: HOME | End: 2018-07-24
Attending: SURGERY | Admitting: SURGERY

## 2018-07-24 DIAGNOSIS — M25.511 PAIN IN RIGHT SHOULDER: ICD-10-CM

## 2018-07-24 DIAGNOSIS — M25.511 ACUTE PAIN OF RIGHT SHOULDER: ICD-10-CM

## 2023-10-19 NOTE — PROGRESS NOTES
Left message for patient to call back for PAT. Graft Donor Site Bandage (Optional-Leave Blank If You Don't Want In Note): Steri-strips and a pressure bandage were applied to the donor site.

## 2023-10-20 RX ORDER — DICLOFENAC SODIUM 1 MG/ML
1 SOLUTION/ DROPS OPHTHALMIC 4 TIMES DAILY
COMMUNITY

## 2023-10-20 RX ORDER — LORAZEPAM 0.5 MG/1
0.5 TABLET ORAL 2 TIMES DAILY PRN
COMMUNITY

## 2023-10-20 RX ORDER — AMOXICILLIN 500 MG
CAPSULE ORAL DAILY
COMMUNITY

## 2023-10-20 RX ORDER — GLIPIZIDE 10 MG/1
10 TABLET ORAL
COMMUNITY

## 2023-10-20 RX ORDER — PHENOL 1.4 %
AEROSOL, SPRAY (ML) MUCOUS MEMBRANE
COMMUNITY

## 2023-10-20 RX ORDER — MELATONIN
1000 DAILY
COMMUNITY

## 2023-10-20 RX ORDER — LIDOCAINE 40 MG/G
CREAM TOPICAL PRN
COMMUNITY

## 2023-10-20 RX ORDER — UBIDECARENONE 75 MG
50 CAPSULE ORAL DAILY
COMMUNITY

## 2023-10-20 RX ORDER — MIRTAZAPINE 30 MG/1
30 TABLET, FILM COATED ORAL NIGHTLY
COMMUNITY

## 2023-10-24 ENCOUNTER — ANESTHESIA EVENT (OUTPATIENT)
Dept: ENDOSCOPY | Age: 68
End: 2023-10-24
Payer: OTHER GOVERNMENT

## 2023-10-24 NOTE — ANESTHESIA PRE PROCEDURE
10/13/2017 08:40 AM    CO2 35 10/13/2017 08:40 AM    BUN 20 10/13/2017 08:40 AM    CREATININE 1.1 10/13/2017 08:40 AM    GFRAA >60 10/13/2017 08:40 AM    LABGLOM >60 10/13/2017 08:40 AM    GLUCOSE 176 10/13/2017 08:40 AM    PROT 6.9 02/18/2017 09:56 AM    CALCIUM 9.5 10/13/2017 08:40 AM    BILITOT 0.4 02/18/2017 09:56 AM    ALKPHOS 104 02/18/2017 09:56 AM    AST 25 02/18/2017 09:56 AM    ALT 29 02/18/2017 09:56 AM       POC Tests: No results for input(s): \"POCGLU\", \"POCNA\", \"POCK\", \"POCCL\", \"POCBUN\", \"POCHEMO\", \"POCHCT\" in the last 72 hours. Coags:   Lab Results   Component Value Date/Time    PROTIME 12.5 02/18/2017 09:56 AM    INR 1.09 02/18/2017 09:56 AM    APTT 26.6 02/18/2017 09:56 AM       HCG (If Applicable): No results found for: \"PREGTESTUR\", \"PREGSERUM\", \"HCG\", \"HCGQUANT\"     ABGs:   Lab Results   Component Value Date/Time    PO2ART 60 02/16/2017 12:00 AM    BUQ3LIG 63.0 02/16/2017 12:00 AM    SCT4HIE 46.9 02/16/2017 12:00 AM        Type & Screen (If Applicable):  No results found for: \"LABABO\", \"LABRH\"    Drug/Infectious Status (If Applicable):  No results found for: \"HIV\", \"HEPCAB\"    COVID-19 Screening (If Applicable): No results found for: \"COVID19\"        Anesthesia Evaluation  Patient summary reviewed  Airway: Mallampati: II  TM distance: >3 FB   Neck ROM: full  Mouth opening: > = 3 FB   Dental:          Pulmonary: breath sounds clear to auscultation  (+) COPD:  sleep apnea: on CPAP,                             Cardiovascular:    (+) hypertension:, dysrhythmias:, hyperlipidemia      ECG reviewed  Rhythm: regular  Rate: normal  Echocardiogram reviewed               ROS comment:  TTE procedure:ECHOCARDIOGRAM LIMITED. Procedure Date  Date: 02/20/2017 Start: 10:11 AM    Summary   Technically difficult examination due to body habitus. Ejection fraction is visually estimated around 30-40%. Mild concentric left ventricular hypertrophy. Right ventricular systolic pressure of 32 mm Hg.    Mild

## 2023-10-25 NOTE — PROGRESS NOTES
Spoke with patient and he will arrive at 0630 at [facility] on 10/26/2023 for his procedure at 0800. Orders are in epic.

## 2023-10-26 ENCOUNTER — HOSPITAL ENCOUNTER (OUTPATIENT)
Age: 68
Setting detail: OUTPATIENT SURGERY
Discharge: HOME | End: 2023-10-26
Attending: INTERNAL MEDICINE | Admitting: INTERNAL MEDICINE
Payer: OTHER GOVERNMENT

## 2023-10-26 ENCOUNTER — ANESTHESIA (OUTPATIENT)
Dept: ENDOSCOPY | Age: 68
End: 2023-10-26
Payer: OTHER GOVERNMENT

## 2023-10-26 VITALS
OXYGEN SATURATION: 96 % | WEIGHT: 315 LBS | HEIGHT: 71 IN | DIASTOLIC BLOOD PRESSURE: 67 MMHG | TEMPERATURE: 97.3 F | SYSTOLIC BLOOD PRESSURE: 124 MMHG | RESPIRATION RATE: 18 BRPM | BODY MASS INDEX: 44.1 KG/M2 | HEART RATE: 86 BPM

## 2023-10-26 DIAGNOSIS — Z86.010 PERSONAL HISTORY OF COLONIC POLYPS: ICD-10-CM

## 2023-10-26 DIAGNOSIS — I25.10 ATHEROSCLEROSIS OF NATIVE CORONARY ARTERY, UNSPECIFIED WHETHER ANGINA PRESENT, UNSPECIFIED WHETHER NATIVE OR TRANSPLANTED HEART: ICD-10-CM

## 2023-10-26 LAB
GLUCOSE BLD-MCNC: 148 MG/DL (ref 70–99)
GLUCOSE BLD-MCNC: 205 MG/DL (ref 70–99)

## 2023-10-26 PROCEDURE — 3609010600 HC COLONOSCOPY POLYPECTOMY SNARE/COLD BIOPSY: Performed by: INTERNAL MEDICINE

## 2023-10-26 PROCEDURE — 7100000011 HC PHASE II RECOVERY - ADDTL 15 MIN: Performed by: INTERNAL MEDICINE

## 2023-10-26 PROCEDURE — 6360000002 HC RX W HCPCS

## 2023-10-26 PROCEDURE — 6370000000 HC RX 637 (ALT 250 FOR IP): Performed by: INTERNAL MEDICINE

## 2023-10-26 PROCEDURE — 2580000003 HC RX 258: Performed by: ANESTHESIOLOGY

## 2023-10-26 PROCEDURE — 7100000000 HC PACU RECOVERY - FIRST 15 MIN: Performed by: INTERNAL MEDICINE

## 2023-10-26 PROCEDURE — 7100000001 HC PACU RECOVERY - ADDTL 15 MIN: Performed by: INTERNAL MEDICINE

## 2023-10-26 PROCEDURE — 3700000001 HC ADD 15 MINUTES (ANESTHESIA): Performed by: INTERNAL MEDICINE

## 2023-10-26 PROCEDURE — 2709999900 HC NON-CHARGEABLE SUPPLY: Performed by: INTERNAL MEDICINE

## 2023-10-26 PROCEDURE — 88305 TISSUE EXAM BY PATHOLOGIST: CPT | Performed by: PATHOLOGY

## 2023-10-26 PROCEDURE — 82962 GLUCOSE BLOOD TEST: CPT

## 2023-10-26 PROCEDURE — 2500000003 HC RX 250 WO HCPCS

## 2023-10-26 PROCEDURE — 2580000003 HC RX 258: Performed by: INTERNAL MEDICINE

## 2023-10-26 PROCEDURE — 3700000000 HC ANESTHESIA ATTENDED CARE: Performed by: INTERNAL MEDICINE

## 2023-10-26 PROCEDURE — 7100000010 HC PHASE II RECOVERY - FIRST 15 MIN: Performed by: INTERNAL MEDICINE

## 2023-10-26 RX ORDER — SODIUM CHLORIDE, SODIUM LACTATE, POTASSIUM CHLORIDE, CALCIUM CHLORIDE 600; 310; 30; 20 MG/100ML; MG/100ML; MG/100ML; MG/100ML
INJECTION, SOLUTION INTRAVENOUS CONTINUOUS
Status: DISCONTINUED | OUTPATIENT
Start: 2023-10-26 | End: 2023-10-26 | Stop reason: HOSPADM

## 2023-10-26 RX ORDER — ROCURONIUM BROMIDE 10 MG/ML
INJECTION, SOLUTION INTRAVENOUS PRN
Status: DISCONTINUED | OUTPATIENT
Start: 2023-10-26 | End: 2023-10-26 | Stop reason: SDUPTHER

## 2023-10-26 RX ORDER — LABETALOL HYDROCHLORIDE 5 MG/ML
10 INJECTION, SOLUTION INTRAVENOUS
Status: DISCONTINUED | OUTPATIENT
Start: 2023-10-26 | End: 2023-10-26 | Stop reason: HOSPADM

## 2023-10-26 RX ORDER — LIDOCAINE HYDROCHLORIDE 20 MG/ML
INJECTION, SOLUTION INTRAVENOUS PRN
Status: DISCONTINUED | OUTPATIENT
Start: 2023-10-26 | End: 2023-10-26 | Stop reason: SDUPTHER

## 2023-10-26 RX ORDER — PROPOFOL 10 MG/ML
INJECTION, EMULSION INTRAVENOUS PRN
Status: DISCONTINUED | OUTPATIENT
Start: 2023-10-26 | End: 2023-10-26 | Stop reason: SDUPTHER

## 2023-10-26 RX ORDER — SODIUM CHLORIDE 0.9 % (FLUSH) 0.9 %
5-40 SYRINGE (ML) INJECTION PRN
Status: DISCONTINUED | OUTPATIENT
Start: 2023-10-26 | End: 2023-10-26 | Stop reason: HOSPADM

## 2023-10-26 RX ORDER — ONDANSETRON 2 MG/ML
4 INJECTION INTRAMUSCULAR; INTRAVENOUS
Status: DISCONTINUED | OUTPATIENT
Start: 2023-10-26 | End: 2023-10-26 | Stop reason: HOSPADM

## 2023-10-26 RX ORDER — ONDANSETRON 2 MG/ML
INJECTION INTRAMUSCULAR; INTRAVENOUS PRN
Status: DISCONTINUED | OUTPATIENT
Start: 2023-10-26 | End: 2023-10-26 | Stop reason: SDUPTHER

## 2023-10-26 RX ORDER — SODIUM CHLORIDE 9 MG/ML
INJECTION, SOLUTION INTRAVENOUS PRN
Status: DISCONTINUED | OUTPATIENT
Start: 2023-10-26 | End: 2023-10-26 | Stop reason: HOSPADM

## 2023-10-26 RX ORDER — FENTANYL CITRATE 50 UG/ML
25 INJECTION, SOLUTION INTRAMUSCULAR; INTRAVENOUS EVERY 5 MIN PRN
Status: DISCONTINUED | OUTPATIENT
Start: 2023-10-26 | End: 2023-10-26 | Stop reason: HOSPADM

## 2023-10-26 RX ORDER — SODIUM CHLORIDE 0.9 % (FLUSH) 0.9 %
5-40 SYRINGE (ML) INJECTION EVERY 12 HOURS SCHEDULED
Status: DISCONTINUED | OUTPATIENT
Start: 2023-10-26 | End: 2023-10-26 | Stop reason: HOSPADM

## 2023-10-26 RX ORDER — HYDRALAZINE HYDROCHLORIDE 20 MG/ML
10 INJECTION INTRAMUSCULAR; INTRAVENOUS
Status: DISCONTINUED | OUTPATIENT
Start: 2023-10-26 | End: 2023-10-26 | Stop reason: HOSPADM

## 2023-10-26 RX ORDER — FENTANYL CITRATE 50 UG/ML
50 INJECTION, SOLUTION INTRAMUSCULAR; INTRAVENOUS EVERY 5 MIN PRN
Status: DISCONTINUED | OUTPATIENT
Start: 2023-10-26 | End: 2023-10-26 | Stop reason: HOSPADM

## 2023-10-26 RX ORDER — DEXAMETHASONE SODIUM PHOSPHATE 4 MG/ML
INJECTION, SOLUTION INTRA-ARTICULAR; INTRALESIONAL; INTRAMUSCULAR; INTRAVENOUS; SOFT TISSUE PRN
Status: DISCONTINUED | OUTPATIENT
Start: 2023-10-26 | End: 2023-10-26 | Stop reason: SDUPTHER

## 2023-10-26 RX ADMIN — SUGAMMADEX 200 MG: 100 INJECTION, SOLUTION INTRAVENOUS at 09:20

## 2023-10-26 RX ADMIN — SODIUM CHLORIDE, POTASSIUM CHLORIDE, SODIUM LACTATE AND CALCIUM CHLORIDE: 600; 310; 30; 20 INJECTION, SOLUTION INTRAVENOUS at 07:07

## 2023-10-26 RX ADMIN — PROPOFOL 150 MG: 10 INJECTION, EMULSION INTRAVENOUS at 08:55

## 2023-10-26 RX ADMIN — DEXAMETHASONE SODIUM PHOSPHATE 4 MG: 4 INJECTION, SOLUTION INTRAMUSCULAR; INTRAVENOUS at 08:58

## 2023-10-26 RX ADMIN — LIDOCAINE HYDROCHLORIDE 40 MG: 20 INJECTION, SOLUTION INTRAVENOUS at 08:55

## 2023-10-26 RX ADMIN — ROCURONIUM BROMIDE 50 MG: 10 INJECTION INTRAVENOUS at 08:55

## 2023-10-26 RX ADMIN — ONDANSETRON 4 MG: 2 INJECTION INTRAMUSCULAR; INTRAVENOUS at 08:58

## 2023-10-26 ASSESSMENT — PAIN SCALES - GENERAL
PAINLEVEL_OUTOF10: 0

## 2023-10-26 ASSESSMENT — PAIN - FUNCTIONAL ASSESSMENT: PAIN_FUNCTIONAL_ASSESSMENT: 0-10

## 2023-10-26 NOTE — ANESTHESIA POSTPROCEDURE EVALUATION
Department of Anesthesiology  Postprocedure Note    Patient: Carol Resendiz  MRN: [id number]  [address] [facility] 1955  Date of evaluation: 10/26/2023      Procedure Summary     Date: 10/26/23 Room / Location: [address] [place] [address]    Anesthesia Start: 4959 Anesthesia Stop: 0929    Procedure: COLONOSCOPY POLYPECTOMY SNARE/COLD BIOPSY Diagnosis:       Personal history of colonic polyps      Atherosclerosis of native coronary artery, unspecified whether angina present, unspecified whether native or transplanted heart      (Personal history of colonic polyps [Z86.010])      (Atherosclerosis of native coronary artery, unspecified whether angina present, unspecified whether native or transplanted heart [I25.10])    Surgeons: Andrew Burrell MD Responsible Provider: Azul Bateman MD    Anesthesia Type: General ASA Status: 4          Anesthesia Type: General    Trang Phase I: Trang Score: 10    Trang Phase II:        Anesthesia Post Evaluation    Patient location during evaluation: PACU  Patient participation: complete - patient participated  Level of consciousness: awake and alert  Airway patency: patent  Nausea & Vomiting: no nausea and no vomiting  Complications: no  Cardiovascular status: hemodynamically stable  Respiratory status: spontaneous ventilation and nasal cannula  Hydration status: stable  Pain management: adequate

## 2023-10-26 NOTE — PROGRESS NOTES
7732- pt. Arrived to pacu via cart from OR. Pt. Attached to monitor and alarms are on. Received report from Danette Elizabeth and SAYDA MEANS. Pt. Denies pain or n/v. Pt. Wearing a simple mask at 6L. Pt. SR on the monitor. Pt. IV infusing without any issues. 8555- pt. Is aox4. He denies pain or n/v. Pt. Is on RA sating 94%. SR on the monitor. Pt. Has tolerated ice chips. Pt. Updated on plan of care and denies any other questions or concerns.

## 2023-10-26 NOTE — PROGRESS NOTES
1025 In to check on pt. Pt ready to go home. Pt myrtle c/o or needs. Call light in reach. 1045 Pt up to side of bed. Pt tolerated well and eager to go home. Call light in reach. Wife and daughter at bedside. 1053 Discharge instructions given to pt, wife and daughter. All voiced understanding of instructions. Daughter to get car. 1056 Pt discharged to home with wife. Pt denies c/o.

## 2023-10-26 NOTE — PROGRESS NOTES
10/26/23 8928   Encounter Summary   Encounter Overview/Reason  Pre-Procedural   Service Provided For: Family   Referral/Consult From: [facility]   Support System Spouse   Last Encounter  10/26/23  (Companioned with family and shared in prayer. No other needs at this time. Family informed how to contact  if they so desired.)   Complexity of Encounter Low   Begin Time 0640   End Time  0645   Total Time Calculated 5 min   Spiritual/Emotional needs   Type Spiritual Support   Assessment/Intervention/Outcome   Assessment Calm;Coping;Desire for reconciliation; Hopeful   Intervention Active listening;Empowerment;Nurtured Hope;Prayer (assurance of)/[place];Sustaining Presence/Ministry of presence   Outcome Comfort;Engaged in conversation;Expressed feelings, needs, and concerns   Plan and Referrals   Plan/Referrals Continue Support (comment)

## 2023-10-26 NOTE — PROGRESS NOTES
Pt received from Danette Rachel [facility] [id number]. Pt has no c/o, wife and daughter at bedside, call light in reach. Pt. Given Diet Pepsi and crackers. No needs at this time.

## 2023-10-26 NOTE — DISCHARGE INSTRUCTIONS
[facility]  [phone]    Do not drive, work around [address] [facility] or use equipment. Do not drink any alcoholic beverages. Do not smoke while alone. Avoid making important decisions. Plan to spend a quiet, relaxed evening @ home. Resume normal activities as you begin to feel better. Eat lightly for your first meal, then gradually increase your diet to what is normal for you. In case of nausea, avoid food and drink only clear liquids. Resume food as nausea ceases. Notify your surgeon if you experience fever, chills, large amount of bleeding, difficulty breathing, persistent nausea and vomiting or any other disturbing problem. Call for a follow-up appointment with your surgeon. COLONOSCOPY    DR. Ramos    FOLLOW UP APPOINTMENT IN 1 WEEK. REPEAT PROCEDURE IN 5 YEARS. TEST ORDERED: NONE     What to expect at home: Your Recovery   Your doctor will tell you when you can eat and do your other usual activities Your doctor will talk to you about when you will need your next colonoscopy. Your doctor can help you decide how often you need to be checked. This will depend on the results of your test and your risk for colorectal cancer. After the test, you may be bloated or have gas pains. You may need to pass gas. If a biopsy was done or a polyp was removed, you may have streaks of blood in your stool (feces) for a few days. This care sheet gives you a general idea about how long it will take for you to recover. But each person recovers at a different pace. Follow the steps below to get better as quickly as possible. How can you care for yourself at home? Activity  Rest when you feel tired. Diet  Follow your doctor's directions for eating. Unless your doctor has told you not to, drink plenty of fluids. This helps to replace the fluids that were lost during the colon prep. DO NOT DRINK ALCOHOL. Medicines  Your doctor will tell you if and when you can restart your medicines.

## 2023-10-26 NOTE — OP NOTE
Operative Note      Patient: Destiny Duran  YOB: 1955  MRN: [id number]    Date of Procedure: 10/26/2023    Pre-Op Diagnosis Codes:     * Personal history of colonic polyps [Z86.010]     * Atherosclerosis of native coronary artery, unspecified whether angina present, unspecified whether native or transplanted heart [I25.10]    [address] [facility]      BRIEF OP REPORT:    Impression:    1) colonoscopy showing 2 polyps ascending colon 3 to 4 mm in size cold snared and retrieved  Small polyp transverse colon cold biopsied off completely   2) mild sigmoid diverticulosis   3) grade 2 internal hemorrhoids        Suggest:   1) advance diet as tolerated   2) follow-up in 2 to 4 weeks   3) repeat colonoscopy in 5 years  High-fiber diet, probiotics every day, 6 4 ounces of water every day     Full EGD/COLONOSCOPY report available by going to \"chart review\" then \"procedures\" then  \"EGD/Colonoscopy\"  then \"View Endoscopy Report\"      Electronically signed by Sarai Lopez MD on 10/26/2023 at 9:28 AM

## 2023-11-28 ENCOUNTER — HOSPITAL ENCOUNTER (OUTPATIENT)
Age: 68
Discharge: HOME | End: 2023-11-28
Payer: MEDICARE

## 2023-11-28 ENCOUNTER — HOSPITAL ENCOUNTER (OUTPATIENT)
Dept: GENERAL RADIOLOGY | Age: 68
Discharge: HOME | End: 2023-11-28
Payer: MEDICARE

## 2023-11-28 DIAGNOSIS — R06.02 SHORTNESS OF BREATH: ICD-10-CM

## 2023-11-28 LAB
ALBUMIN SERPL-MCNC: 4.3 GM/DL (ref 3.4–5)
ALBUMIN SERPL-MCNC: 4.4 GM/DL (ref 3.4–5)
ALP BLD-CCNC: 93 IU/L (ref 40–129)
ALP BLD-CCNC: 94 IU/L (ref 40–128)
ALT SERPL-CCNC: 42 U/L (ref 10–40)
ALT SERPL-CCNC: 42 U/L (ref 10–40)
ANION GAP SERPL CALCULATED.3IONS-SCNC: 12 MMOL/L (ref 4–16)
AST SERPL-CCNC: 25 IU/L (ref 15–37)
AST SERPL-CCNC: 25 IU/L (ref 15–37)
BILIRUB SERPL-MCNC: 0.3 MG/DL (ref 0–1)
BILIRUB SERPL-MCNC: 0.3 MG/DL (ref 0–1)
BILIRUBIN DIRECT: 0.2 MG/DL (ref 0–0.3)
BILIRUBIN, INDIRECT: 0.1 MG/DL (ref 0–0.7)
BUN SERPL-MCNC: 20 MG/DL (ref 6–23)
CALCIUM SERPL-MCNC: 9.4 MG/DL (ref 8.3–10.6)
CHLORIDE BLD-SCNC: 95 MMOL/L (ref 99–110)
CHOLEST SERPL-MCNC: 146 MG/DL
CO2: 32 MMOL/L (ref 21–32)
CREAT SERPL-MCNC: 1.1 MG/DL (ref 0.9–1.3)
ESTIMATED AVERAGE GLUCOSE: 174 MG/DL
GFR SERPL CREATININE-BSD FRML MDRD: >60 ML/MIN/1.73M2
GLUCOSE SERPL-MCNC: 179 MG/DL (ref 70–99)
HBA1C MFR BLD: 7.7 % (ref 4.2–6.3)
HCT VFR BLD CALC: 45 % (ref 42–52)
HDLC SERPL-MCNC: 43 MG/DL
HEMOGLOBIN: 14.2 GM/DL (ref 13.5–18)
LDLC SERPL CALC-MCNC: 59 MG/DL
MCH RBC QN AUTO: 29.8 PG (ref 27–31)
MCHC RBC AUTO-ENTMCNC: 31.6 % (ref 32–36)
MCV RBC AUTO: 94.5 FL (ref 78–100)
PDW BLD-RTO: 12.1 % (ref 11.7–14.9)
PLATELET # BLD: 267 K/CU MM (ref 140–440)
PMV BLD AUTO: 9.3 FL (ref 7.5–11.1)
POTASSIUM SERPL-SCNC: 4.8 MMOL/L (ref 3.5–5.1)
PROLACTIN SERPL IA-MCNC: 44.7 NG/ML
RBC # BLD: 4.76 M/CU MM (ref 4.6–6.2)
SODIUM BLD-SCNC: 139 MMOL/L (ref 135–145)
T4 FREE SERPL-MCNC: 1.63 NG/DL (ref 0.9–1.8)
TOTAL PROTEIN: 6.9 GM/DL (ref 6.4–8.2)
TOTAL PROTEIN: 7.1 GM/DL (ref 6.4–8.2)
TRIGL SERPL-MCNC: 218 MG/DL
TSH SERPL DL<=0.005 MIU/L-ACNC: 1.83 UIU/ML (ref 0.27–4.2)
WBC # BLD: 8 K/CU MM (ref 4–10.5)

## 2023-11-28 PROCEDURE — 84443 ASSAY THYROID STIM HORMONE: CPT

## 2023-11-28 PROCEDURE — 36415 COLL VENOUS BLD VENIPUNCTURE: CPT

## 2023-11-28 PROCEDURE — 80061 LIPID PANEL: CPT

## 2023-11-28 PROCEDURE — 71046 X-RAY EXAM CHEST 2 VIEWS: CPT

## 2023-11-28 PROCEDURE — 84146 ASSAY OF PROLACTIN: CPT

## 2023-11-28 PROCEDURE — 83036 HEMOGLOBIN GLYCOSYLATED A1C: CPT

## 2023-11-28 PROCEDURE — 80076 HEPATIC FUNCTION PANEL: CPT

## 2023-11-28 PROCEDURE — 85027 COMPLETE CBC AUTOMATED: CPT

## 2023-11-28 PROCEDURE — 84439 ASSAY OF FREE THYROXINE: CPT

## 2023-11-28 PROCEDURE — 80053 COMPREHEN METABOLIC PANEL: CPT

## (undated) DEVICE — FORCEPS BX L240CM JAW DIA2.8MM L CAP W/ NDL MIC MESH TOOTH

## (undated) DEVICE — ENDOSCOPY KIT: Brand: MEDLINE INDUSTRIES, INC.

## (undated) DEVICE — SNARE VASC L240CM LOOP W10MM SHTH DIA2.4MM RND STIFF CLD